# Patient Record
Sex: FEMALE | Race: WHITE
[De-identification: names, ages, dates, MRNs, and addresses within clinical notes are randomized per-mention and may not be internally consistent; named-entity substitution may affect disease eponyms.]

---

## 2017-09-26 ENCOUNTER — HOSPITAL ENCOUNTER (INPATIENT)
Dept: HOSPITAL 74 - JLDR | Age: 32
LOS: 2 days | Discharge: HOME | DRG: 560 | End: 2017-09-28
Attending: OBSTETRICS & GYNECOLOGY | Admitting: OBSTETRICS & GYNECOLOGY
Payer: COMMERCIAL

## 2017-09-26 VITALS — BODY MASS INDEX: 32.3 KG/M2

## 2017-09-26 DIAGNOSIS — Z3A.39: ICD-10-CM

## 2017-09-26 LAB
ANION GAP SERPL CALC-SCNC: 12 MMOL/L (ref 8–16)
BASOPHILS # BLD: 0.6 % (ref 0–2)
CALCIUM SERPL-MCNC: 8.8 MG/DL (ref 8.5–10.1)
CO2 SERPL-SCNC: 20 MMOL/L (ref 21–32)
CREAT SERPL-MCNC: 0.4 MG/DL (ref 0.55–1.02)
DEPRECATED RDW RBC AUTO: 13.3 % (ref 11.6–15.6)
EOSINOPHIL # BLD: 1.1 % (ref 0–4.5)
GLUCOSE SERPL-MCNC: 74 MG/DL (ref 74–106)
INR BLD: 0.95 (ref 0.82–1.09)
MCH RBC QN AUTO: 29.5 PG (ref 25.7–33.7)
MCHC RBC AUTO-ENTMCNC: 33.6 G/DL (ref 32–36)
MCV RBC: 87.9 FL (ref 80–96)
NEUTROPHILS # BLD: 69.9 % (ref 42.8–82.8)
PLATELET # BLD AUTO: 302 K/MM3 (ref 134–434)
PMV BLD: 9 FL (ref 7.5–11.1)
PT PNL PPP: 10.4 SEC (ref 9.98–11.88)
WBC # BLD AUTO: 13.1 K/MM3 (ref 4–10)

## 2017-09-26 RX ADMIN — ACETAMINOPHEN PRN MG: 325 TABLET ORAL at 11:38

## 2017-09-26 RX ADMIN — IBUPROFEN PRN MG: 600 TABLET, FILM COATED ORAL at 06:45

## 2017-09-26 RX ADMIN — ACETAMINOPHEN PRN MG: 325 TABLET ORAL at 21:22

## 2017-09-26 RX ADMIN — Medication SCH MLS/HR: at 09:46

## 2017-09-26 RX ADMIN — DOCUSATE SODIUM,SENNOSIDES PRN TABLET: 50; 8.6 TABLET, FILM COATED ORAL at 21:23

## 2017-09-26 RX ADMIN — Medication SCH MLS/HR: at 06:25

## 2017-09-26 RX ADMIN — IBUPROFEN PRN MG: 600 TABLET, FILM COATED ORAL at 21:22

## 2017-09-26 RX ADMIN — ACETAMINOPHEN PRN MG: 325 TABLET ORAL at 15:45

## 2017-09-26 RX ADMIN — ACETAMINOPHEN PRN MG: 325 TABLET ORAL at 06:45

## 2017-09-26 NOTE — HP
Admitting History and Physical





- Admission


Chief Complaint: labor pains


History of Present Illness: 


33 y/o  at term comes for labor pains. She is a pt of [facility] with prior 

vaginal deliveries.No prenatal issues.


History Source: Patient





- Past Medical History


CNS: No: Alzheimer's, CVA, Dementia, Migraine, Multiple Sclerosis, Peripheral 

Neuropathy, Parkinson's, Seizure, Syncope, TIA, Vertigo, Other


Cardiovascular: No: AFIB, Aneurysm, Aortic Insufficiency, Aortic Stenosis, CAD, 

CHF, Deep Vein Thrombosis, HTN, Hyperlipdemia, MI, Mitral Insufficiency, Mitral 

Stenosis, Murmur, Pulmonary Hypertension, Other


Pulmonary: No: Asthma, Bronchitis, Cancer, COPD, O2 Dependent, Pneumonia, 

Previously Intubated, Pulmonary Embolus, Pulmonary Fibrosis, Sleep Apnea, Other


Gastrointestinal: No: Ascites, Cancer, Constipation, Crohn's Disease, 

Diverticulitis, Diverticulosis, Esophageal Varices, Gastritis, GERD, GI Bleed, 

Hemorrhoids, Hiatal Hernia, Inflamatory Bowel Disease, Irritable Bowel Disease, 

Pancreatitis, Peptic Ulcer Disease, Ulcerative Colitis, Other


Hepatobiliary: No: Cirrhosis, Cholelithiasis, Cholecystitis, Choledocholithiasis

, Hepatitis A, Hepatitis B, Hepatitis C, Other


Renal/: No: Renal Failure, Renal Inusuff, BPH, Cancer, Hematuria, Hemodialysis

, Neurogenic Bladder, Renal Calculi, UTI, Other


Reproductive: No: Ectopic Pregnancy, Endometriosis, Fibroids, PID, Polycystic 

Ovary Syndrome, Postmenopausal, Other


...LMP: 12


...: 4


...Para: 3


Heme/Onc: No: Anemia, B12 Deficiency, Bleeding Disorder, Cancer, Current 

Chemotherapy, Current Radiation Therapy, Hemochromatosis, Hypercoaguable State, 

Myeloproliferative Synd, Sickle Cell Disease, Sickle Cell Trait, 

Thrombocytopenia, Other


Infectious Disease: No: AIDS, C-Diff, Herpes Zoster, HIV, MRSA, STD's, 

Tuberculosis, VREF, Other


Psych: No: Addictions, Anxiety, Bipolar, Depression, Panic, Psychosis, 

Schizophrenia, Other


Musculoskeletal: No: Bursitis, Chronic low back pain, Hemiparesis, Hemiplegia, 

Osteoarthritis, Paraplegia, Other


Rheumatology: No: Fibromyalgia, Gout, Lupus, Rheumatoid Arthritis, Sarcoidosis, 

Vasculitis, Other


ENT: No: Allergic Rhinitis, Sinusitis, Other


Endocrine: No: [place]'s Disease, Cushing's Disease, Diabetes Insipidus, 

Diabetes Mellitus, Hyperparathyroidism, Hyperthyroidism, Hypothyroidism, 

Osteopenia, SIADH, Other


Dermatology: No: Basal Cell, Cellulitis, Eczema, Melanoma, Psoriasis, Squamous 

Cell, Other





- Past Surgical History


Past Surgical History: No: None, AAA Repair, AICD, Amputation, Appendectomy, 

Arthrosocopy, AV Fistula/Graft, Bariatric Surgery, Breast Biopsy, Bypass, CABG, 

Carotid Endarterectomy, Cataract Removal, Cholecystectomy, Colectomy, 

Colonoscopy, Colostomy, Craniotomy, , Cystectomy, Hernia Repair, 

Hysterectomy, Ileal Conduit, Ileosotomy, Joint Replacement, Kidney Transplant, 

Laminectomy, Liver Transplant, Mastectomy, Nephrectomy, Oopherectomy, 

Orchiectomy, Permanent Pacemaker, Prostatectomy, Splenectomy, Stent, Thoracotomy

, TURP, Tonsillectomy, Tubal Ligation, Upper Endoscopy, Valve Replacement, 

Vasectomy, Vein Stripping/Ligation





- Advance Directives


Advance Directives: No: Living Will, Health Care Proxy, DNR, Organ Donor, 

Tissue Donor, MOLST





- Smoking History


Smoking history: Never smoked


Have you smoked in the past 12 months: No


Aproximately how many cigarettes per day: 0





- Alcohol/Substance Use


Hx Alcohol Use: No


History of Substance Use: denies: None, Cocaine, Heroin, Marijuana, Prescription

, Tranquilizers





- Social History


Usual Living Arrangement: No: Alone, With Spouse, With Parent, With Significant 

Other, With Child, Assisted Living, Nursing Home, Other





Home Medications





- Allergies


Allergies/Adverse Reactions: 


 Allergies











Allergy/AdvReac Type Severity Reaction Status Date / Time


 


erythromycin base Allergy Mild Difficulty Verified 17 22:14





[Erythromycin Base]   Breathing  














- Home Medications


Home Medications: 


Ambulatory Orders





Metoprolol Succinate [Toprol Xl -] 50 mg PO DAILY 17 


Prenatal Vit/Iron Fumarate/FA [Prenatal Tablet] 1 tab PO DAILY 17 











Review of Systems





- Review of Systems


Constitutional: reports: No Symptoms


Eyes: reports: No Symptoms


HENT: reports: No Symptoms


Neck: reports: No Symptoms


Cardiovascular: reports: No Symptoms


Respiratory: reports: No Symptoms


Gastrointestinal: reports: No Symptoms


Genitourinary: reports: No Symptoms


Breasts: reports: No Symptoms Reported


Musculoskeletal: reports: No Symptoms


Integumentary: reports: No Symptoms


Neurological: reports: No Symptoms


Endocrine: reports: No Symptoms





Physical Examination


Vital Signs: 


 Vital Signs











Temperature  98.5 F   17 07:00


 


Pulse Rate  66   17 07:00


 


Respiratory Rate  20   17 07:00


 


Blood Pressure  131/81   17 07:00


 


O2 Sat by Pulse Oximetry (%)  98   17 05:45











Constitutional: Yes: Well Nourished


Eyes: Yes: WNL


HENT: Yes: WNL


Neck: Yes: WNL, Rigid


Cardiovascular: Yes: WNL


Respiratory: Yes: WNL


Gastrointestinal: Yes: WNL


...Rectal Exam: Yes: WNL


Renal/: Yes: WNL


Labs: 


 CBC, BMP





 17 06:00 











Assessment/Plan


as above


admit


labs


expect

## 2017-09-26 NOTE — PN
Delivery





- Delivery


Vaginal Delivery: No Problems


Type of Anesthesia: None


Episiotomy/Laceration: None


EBL (cc): 250 (Called to evaluated due to FD and pushing)





Delivery, Single Birth





- Stages of Labor


Placenta: Yes: Spontaneous





- Condition of Infant


Infant Gender: Female


Position: OA

## 2017-09-27 LAB
BASOPHILS # BLD: 0.6 % (ref 0–2)
DEPRECATED RDW RBC AUTO: 13.2 % (ref 11.6–15.6)
EOSINOPHIL # BLD: 2.2 % (ref 0–4.5)
MCH RBC QN AUTO: 29.5 PG (ref 25.7–33.7)
MCHC RBC AUTO-ENTMCNC: 33.8 G/DL (ref 32–36)
MCV RBC: 87.3 FL (ref 80–96)
NEUTROPHILS # BLD: 63.1 % (ref 42.8–82.8)
PLATELET # BLD AUTO: 252 K/MM3 (ref 134–434)
PMV BLD: 8.5 FL (ref 7.5–11.1)
WBC # BLD AUTO: 10.8 K/MM3 (ref 4–10)

## 2017-09-27 RX ADMIN — ACETAMINOPHEN PRN MG: 325 TABLET ORAL at 16:24

## 2017-09-27 RX ADMIN — DOCUSATE SODIUM,SENNOSIDES PRN TABLET: 50; 8.6 TABLET, FILM COATED ORAL at 19:59

## 2017-09-27 RX ADMIN — IBUPROFEN PRN MG: 600 TABLET, FILM COATED ORAL at 03:08

## 2017-09-27 RX ADMIN — IBUPROFEN PRN MG: 600 TABLET, FILM COATED ORAL at 07:15

## 2017-09-27 RX ADMIN — ACETAMINOPHEN PRN MG: 325 TABLET ORAL at 07:12

## 2017-09-27 RX ADMIN — Medication SCH: at 12:02

## 2017-09-27 RX ADMIN — ACETAMINOPHEN PRN MG: 325 TABLET ORAL at 03:08

## 2017-09-27 RX ADMIN — IBUPROFEN PRN MG: 600 TABLET, FILM COATED ORAL at 16:24

## 2017-09-27 RX ADMIN — ACETAMINOPHEN PRN MG: 325 TABLET ORAL at 19:59

## 2017-09-27 RX ADMIN — IBUPROFEN PRN MG: 600 TABLET, FILM COATED ORAL at 11:42

## 2017-09-27 RX ADMIN — ACETAMINOPHEN PRN MG: 325 TABLET ORAL at 11:41

## 2017-09-27 RX ADMIN — IBUPROFEN PRN MG: 600 TABLET, FILM COATED ORAL at 20:00

## 2017-09-27 NOTE — CON.NEP
Consult


Consult Specialty:: Nephrology


Referred by:: Dr. Adams


Reason for Consultation:: Essential Hypertension Pre-Pregnancy





- History of Present Illness


Chief Complaint: Labor


History of Present Illness: 


This is a 32 year old woman with PMhx of essential hypertension (Dx 3 years ago)

, Asthma who presented at term in Labor now s/p vaginal delivery who is 

normotensive at this time on Metoprolol at home. Pt has no acute complaints. 

Reports being diagnosed with hypertension 3 years ago and started on Meds that 

required titration up. Pt reports that when her BP is high she gets HA's. BP 

has been fine throughout the pregnancy. No SOB, chest pain, abd pain, N/V/D. 

Has been taking metoprolol throughout the pregnancy. 





- History Source


History Provided By: Patient


Limitations to Obtaining History: No Limitations





- Past Medical History


CNS: No: Alzheimer's, CVA, Dementia, Migraine, Multiple Sclerosis, Peripheral 

Neuropathy, Parkinson's, Seizure, Syncope, TIA, Vertigo, Other


Cardio/Vascular: Yes: HTN.  No: AFIB, Aneurysm, Aortic Insufficiency, Aortic 

Stenosis, CAD, CHF, Deep Vein Thrombosis, Hyperlipdemia, MI, Mitral 

Insufficiency, Mitral Stenosis, Murmur, Pulmonary Hypertension, Other


Pulmonary: Yes: Asthma.  No: Bronchitis, Cancer, COPD, O2 Dependent, Pneumonia, 

Previously Intubated, Pulmonary Embolus, Pulmonary Fibrosis, Sleep Apnea, Other


Gastrointestinal: No: Ascites, Cancer, Constipation, Crohn's Disease, 

Diverticulitis, Diverticulosis, Esophageal Varices, Gastritis, GERD, GI Bleed, 

Hemorrhoids, Hiatal Hernia, Inflamatory Bowel Disease, Irritable Bowel Disease, 

Pancreatitis, Peptic Ulcer Disease, Ulcerative Colitis, Other


Hepatobiliary: No: Cirrhosis, Cholelithiasis, Cholecystitis, Choledocholithiasis

, Hepatitis A, Hepatitis B, Hepatitis C, Other


Renal/: No: Renal Failure, Renal Inusuff, BPH, Cancer, Hematuria, Hemodialysis

, Neurogenic Bladder, Renal Calculi, UTI, Other


...LMP: 12


Infectious Disease: No: AIDS, C-Diff, Herpes Zoster, HIV, MRSA, STD's, 

Tuberculosis, VREF, Other


Psych: No: Addictions, Anxiety, Bipolar, Depression, Panic, Psychosis, 

Schizophrenia, Other


Musculoskeletal: No: Bursitis, Chronic low back pain, Hemiparesis, Hemiplegia, 

Osteoarthritis, Paraplegia, Other


Rheumatology: No: Fibromyalgia, Gout, Lupus, Rheumatoid Arthritis, Sarcoidosis, 

Vasculitis, Other


ENT: No: Allergic Rhinitis, Sinusitis, Other


Endocrine: No: [place]'s Disease, Cushing's Disease, Diabetes Insipidus, 

Diabetes Mellitus, Hyperparathyroidism, Hyperthyroidism, Hypothyroidism, 

Osteopenia, SIADH, Other


Dermatology: No: Basal Cell, Cellulitis, Eczema, Melanoma, Psoriasis, Squamous 

Cell, Other





- Past Surgical History


Past Surgical History: No: None, AAA Repair, AICD, Amputation, Appendectomy, 

Arthrosocopy, AV Fistula/Graft, Bariatric Surgery, Breast Biopsy, Bypass, CABG, 

Carotid Endarterectomy, Cataract Removal, Cholecystectomy, Colectomy, 

Colonoscopy, Colostomy, Craniotomy, , Cystectomy, Hernia Repair, 

Hysterectomy, Ileal Conduit, Ileosotomy, Joint Replacement, Kidney Transplant, 

Laminectomy, Liver Transplant, Mastectomy, Nephrectomy, Oopherectomy, 

Orchiectomy, Permanent Pacemaker, Prostatectomy, Splenectomy, Stent, Thoracotomy

, TURP, Tonsillectomy, Tubal Ligation, Upper Endoscopy, Valve Replacement, 

Vasectomy, Vein Stripping/Ligation





- Alcohol/Substance Use


Hx Alcohol Use: No


History of Substance Use: denies: None, Cocaine, Heroin, Marijuana, Prescription

, Tranquilizers





- Smoking History


Smoking history: Never smoked


Have you smoked in the past 12 months: No


Aproximately how many cigarettes per day: 0





Home Medications





- Allergies


Allergies/Adverse Reactions: 


 Allergies











Allergy/AdvReac Type Severity Reaction Status Date / Time


 


erythromycin base Allergy Mild Difficulty Verified 17 22:14





[Erythromycin Base]   Breathing  














- Home Medications


Home Medications: 


Ambulatory Orders





Metoprolol Succinate [Toprol Xl -] 50 mg PO DAILY 17 


Prenatal Vit/Iron Fumarate/FA [Prenatal Tablet] 1 tab PO DAILY 17 











Review of Systems





- Review of Systems


Constitutional: reports: No Symptoms


Eyes: reports: No Symptoms


HENT: reports: No Symptoms


Neck: reports: No Symptoms


Respiratory: reports: Snoring


Gastrointestinal: reports: No Symptoms


Genitourinary: reports: No Symptoms


Musculoskeletal: reports: No Symptoms


Integumentary: reports: No Symptoms


Neurological: reports: No Symptoms





Nephrology Consult





- Height


Height: 4 ft 10 in





- Weight


Weight: 155 lb





- BMI


Body Mass Index (BMI): 32.3





- Lab Results


CBC,BMP: 


 CBC, BMP





 17 06:50 





 17 06:00 








Anion Gap: 


 Anion Gap











Anion Gap  12  (8-16)   17  06:00    














- Physical Examination


Vital Signs: 


 Vital Signs











Temperature  97.9 F   17 06:00


 


Pulse Rate  62   17 06:00


 


Respiratory Rate  18   17 06:00


 


Blood Pressure  122/77   17 06:00


 


O2 Sat by Pulse Oximetry (%)  100   17 08:00











Constitutional: Yes: No Distress, Calm


Eyes: Yes: Conjunctiva Clear


HENT: Yes: Atraumatic


Neck: Yes: Supple


Cardiovascular: Yes: Regular Rate and Rhythm


Respiratory: Yes: Regular, CTA Bilaterally


Extremities: No: Cold, Cool, Cyanosis


Edema: No





Problem List





- Problems


(1) Vaginal delivery


Code(s): O80 - ENCOUNTER FOR FULL-TERM UNCOMPLICATED DELIVERY





(2) Hypertension


Code(s): I10 - ESSENTIAL (PRIMARY) HYPERTENSION   








Assessment/Plan


32 year old woman with PMhx of essential hypertension (Dx 3 years ago), Asthma 

who presented at term in Labor now s/p vaginal delivery who is normotensive at 

this time on Metoprolol at home.





#Hx of Essential Hypertension now with normal BP following pregnancy


Pt likely with improved BP due to physiologic effects of the pregnancy


would not continue metoprolol at this time as the risk of hypotension is high  


would monitor BP as a inpatient, if it remains consistently below 140/90 would 

monitor off medication as a outpatient


given her age she would benafit from a secondary hypertension w/u


she does have a family history of hypertension (grandparents)





Thank you 


Will follow





Andres Mcgarry DO

## 2017-09-27 NOTE — PN
Post Partum Progress Note





- Subjective


Subjective: 


no c/o headache or cramps 


pt requests for her BP meds which she has been taking for 1 year 





Post Partum Day: 1


Type of Delivery: 


Vital Signs: 


 Vital Signs











Temperature  97.9 F   17 06:00


 


Pulse Rate  62   17 06:00


 


Respiratory Rate  18   17 06:00


 


Blood Pressure  122/77   17 06:00


 


O2 Sat by Pulse Oximetry (%)  100   17 08:00











Breast Exam: Yes: Soft, Other (bottle feding ).  No: Engorged


Uterus: Yes: Fundus Firm, Fundus below umbilicus, Non-tender


Lochia: Yes: Rubra


Lochia, amount: Moderate


Extremities: Yes: Calves non-tender


Perineum: Yes: Intact


Activity: Ambulating





- Labs


Labs: 


 CBC











WBC  10.8 K/mm3 (4.0-10.0)  H  17  06:50    


 


RBC  3.60 M/mm3 (3.60-5.2)   17  06:50    


 


Hgb  10.6 GM/dL (10.7-15.3)  L  17  06:50    


 


Hct  31.5 % (32.4-45.2)  L  17  06:50    


 


MCV  87.3 fl (80-96)   17  06:50    


 


MCH  29.5 pg (25.7-33.7)   17  06:50    


 


MCHC  33.8 g/dl (32.0-36.0)   17  06:50    


 


RDW  13.2 % (11.6-15.6)   17  06:50    


 


Plt Count  252 K/MM3 (134-434)   17  06:50    


 


MPV  8.5 fl (7.5-11.1)   17  06:50    


 


Neutrophils %  63.1 % (42.8-82.8)   17  06:50    


 


Lymphocytes %  28.2 % (8-40)  D 17  06:50    


 


Monocytes %  5.9 % (3.8-10.2)   17  06:50    


 


Eosinophils %  2.2 % (0-4.5)  D 17  06:50    


 


Basophils %  0.6 % (0-2.0)   17  06:50    














Assessment/Plan


stable. 


Nephrology consult obtained from Dr Mcgarry , he recommends no need of 

antihypertensive meds presently 


she will need work up for secondary HTN in the office 


plan ct pp care

## 2017-09-28 VITALS — DIASTOLIC BLOOD PRESSURE: 77 MMHG | SYSTOLIC BLOOD PRESSURE: 130 MMHG | HEART RATE: 64 BPM | TEMPERATURE: 98.2 F

## 2017-09-28 RX ADMIN — ACETAMINOPHEN PRN MG: 325 TABLET ORAL at 06:40

## 2017-09-28 RX ADMIN — IBUPROFEN PRN MG: 600 TABLET, FILM COATED ORAL at 01:14

## 2017-09-28 RX ADMIN — ACETAMINOPHEN PRN MG: 325 TABLET ORAL at 01:13

## 2017-09-28 RX ADMIN — IBUPROFEN PRN MG: 600 TABLET, FILM COATED ORAL at 06:40

## 2017-09-28 NOTE — PN
Progress Note (short form)





- Note


Progress Note: 


Renal Follow for Hx of hypertension





Pt seen and examined at the bedside


no acute complaints


for discharge home today


no HA, CP, blurry visoin, SOB





 Vital Signs











Temperature  98.2 F   09/28/17 07:30


 


Pulse Rate  64   09/28/17 07:30


 


Respiratory Rate  20   09/28/17 07:30


 


Blood Pressure  130/77   09/28/17 07:30


 


O2 Sat by Pulse Oximetry (%)  100   09/28/17 07:30








 Intake & Output











 09/25/17 09/26/17 09/27/17 09/28/17





 23:59 23:59 23:59 23:59


 


Intake Total  1700  


 


Balance  1700  


 


Weight  155 lb 155 lb 








NAD, awake and alert


RRR, No M/R


CTA


Ext : trace edema





 CBC, BMP





 09/27/17 06:50 





 09/26/17 06:00 





 Current Medications





Acetaminophen (Tylenol -)  650 mg PO Q3H PRN


   PRN Reason: PAIN


   Last Admin: 09/28/17 06:40 Dose:  650 mg


Benzocaine (Americaine Ointment -)  1 applic TP PRN PRN


   PRN Reason: PAIN


Benzocaine (Americaine 20% Spray -)  1 spray TP PRN PRN


   PRN Reason: PAIN


Bisacodyl (Dulcolax Suppository -)  10 mg RC PRN PRN


   PRN Reason: CONSTIPATION


Dextrose/Lactated Ringer's (Pitocin 20 Units In D5-Lr -)  1,000 mls @ 100 mls/

hr IV ASDIR ROMULO


   Last Admin: 09/27/17 12:02 Dose:  Not Given


Ibuprofen (Motrin -)  600 mg PO Q4H PRN


   PRN Reason: PAIN


   Last Admin: 09/28/17 06:40 Dose:  600 mg


Methylergonovine Maleate (Methergine Injection -)  0.2 mg IM Q4H PRN


   PRN Reason: EXCESSIVE BLEEDING (L&D)


Oxycodone HCl (Roxicodone -)  5 mg PO Q4H PRN


   PRN Reason: PAIN


   Last Admin: 09/26/17 15:45 Dose:  5 mg


Senna/Docusate Sodium (Pericolace -)  2 tablet PO HS PRN


   PRN Reason: CONSTIPATION


   Last Admin: 09/27/17 19:59 Dose:  2 tablet


Witch Hazel/Glycerin (Tucks Pads -)  1 pad TP PRN PRN


   PRN Reason: PAIN





A/p


32 year old woman with PMhx of essential hypertension (Dx 3 years ago), Asthma 

who presented at term in Labor now s/p vaginal delivery who is normotensive at 

this time on Metoprolol at home.





#Hx of Essential Hypertension now with normal BP following pregnancy


BP still below threshold of hypertension


pt can be discharged off meds at this time but may require restarting it as a 

outpatient


should observe a low salt diet


if hypertension comes back may need secondary hypertension work up given age





Thank you


will follow up in the office in 2 weeks





Andres Mcgarry DO





Problem List





- Problems


(1) Vaginal delivery


Code(s): O80 - ENCOUNTER FOR FULL-TERM UNCOMPLICATED DELIVERY





(2) Hypertension


Code(s): I10 - ESSENTIAL (PRIMARY) HYPERTENSION

## 2017-09-28 NOTE — DS
Physical Exam-GYN


Vital Signs: 


 Vital Signs











Temperature  98.2 F   17 07:30


 


Pulse Rate  64   17 07:30


 


Respiratory Rate  20   17 07:30


 


Blood Pressure  130/77   17 07:30


 


O2 Sat by Pulse Oximetry (%)  100   17 07:30











Constitutional: Yes: Well Nourished


Eyes: Yes: Conjunctiva Clear


HENT: Yes: Atraumatic


Neck: Yes: Supple


Cardiovascular: Yes: Regular Rate and Rhythm


Respiratory: Yes: Regular


Gastrointestinal: Yes: Normal Bowel Sounds


External Genitalia: Yes: Normal


Vaginal Exam: Yes: Normal


Cervix: Yes: Normal


Uterus: Yes: Firm


....Post Partum: Yes: Uterus firm, Moderate lochia serosa


Breast(s): Yes: WNL


Neurological: Yes: Alert, Oriented


...Motor Strength: WNL


Psychiatric: Yes: Alert, Oriented


Labs: 


 CBC, BMP





 17 06:50 





 17 06:00 











Delivery





- Delivery


Vaginal Delivery: No Problems


Type of Anesthesia: None


Episiotomy/Laceration: None


EBL (cc): 250





Delivery, Single Birth





- Stages of Labor


Date 1st Stage Initiatied: 17


Time 1st Stage Initiated: 03:00


Date 2nd Stage Initiated: 17


Time 2nd Stage Initiated: 06:10


Date of Delivery: 17


Time of Delivery: 06:15


Time Placenta Delivered: 06:25


Placenta: Yes: Spontaneous





- Condition of Infant


Pediatrician/Neonatologist Present: No


Infant Gender: Female


Birth Weight: 7 lb 1 oz


Position: OA


Total Hours ROM (Hrs/Mins): 5 MINS





- Apgar


  ** 1 Minute


Apgar Total Score: 9





  ** 5 Minutes


Apgar Total Score: 9





- [place] Feeding Plan


Initial Plan: Elected not to breastfeed exclusively throughout hospitalization





Discharge Summary


Reason For Visit: LABOR


Current Active Problems





Hypertension (Acute) 








Procedures: Principal: Normal spontaneous vaginal delivery


Hospital Course: 


33 yo with h/o essential hypertension, status post vaginal delivery, was seen 

by Nephrologist for elevated blood pressure.





Condition: Good





- Instructions


Diet, Activity, Other Instructions: 


Regular diet


No douching, no sexual intercourse x 6 weeks.


F/U with Nephrologist as outpatient.


Disposition: HOME





- Home Medications


Comprehensive Discharge Medication List: 


Ambulatory Orders





Metoprolol Succinate [Toprol Xl -] 50 mg PO DAILY 17 


Prenatal Vit/Iron Fumarate/FA [Prenatal Tablet] 1 tab PO DAILY 17

## 2020-07-14 ENCOUNTER — HOSPITAL ENCOUNTER (EMERGENCY)
Dept: HOSPITAL 74 - JER | Age: 35
LOS: 1 days | Discharge: HOME | End: 2020-07-15
Payer: COMMERCIAL

## 2020-07-14 VITALS — HEART RATE: 105 BPM | SYSTOLIC BLOOD PRESSURE: 152 MMHG | DIASTOLIC BLOOD PRESSURE: 88 MMHG | TEMPERATURE: 97.9 F

## 2020-07-14 VITALS — BODY MASS INDEX: 30.3 KG/M2

## 2020-07-14 DIAGNOSIS — Z3A.34: ICD-10-CM

## 2020-07-14 DIAGNOSIS — J31.0: ICD-10-CM

## 2020-07-14 DIAGNOSIS — J01.00: ICD-10-CM

## 2020-07-14 DIAGNOSIS — O99.513: Primary | ICD-10-CM

## 2020-07-14 PROCEDURE — 3E0F7GC INTRODUCTION OF OTHER THERAPEUTIC SUBSTANCE INTO RESPIRATORY TRACT, VIA NATURAL OR ARTIFICIAL OPENING: ICD-10-PCS

## 2020-07-14 NOTE — PDOC
History of Present Illness





- General


Chief Complaint: Cold Symptoms


Stated Complaint: 34 WEEKS PREGNANT/SOB


Time Seen by Provider: 20 19:44


History Source: Patient


Exam Limitations: No Limitations





Past History





- Travel History


Traveled outside of the country in the last 30 days: No


Close contact w/someone who was outside of country & ill: No





- Medical History


Allergies/Adverse Reactions: 


                                    Allergies











Allergy/AdvReac Type Severity Reaction Status Date / Time


 


erythromycin base Allergy Mild Difficulty Verified 20 19:48





[Erythromycin Base]   Breathing  











Home Medications: 


Ambulatory Orders





Prenatal Vit/Iron Fum/Folic AC [Prenatal Tablet] 1 tab PO DAILY 17 


Amox-Tr/K Cl [Augmentin 400 mg/5 ml Oral Suspension -] 10 ml PO BID #140 ml 

20 


Loratadine [Claritin -] 10 mg PO DAILY #7 tablet 20 








Asthma: No


Cancer: No


Cardiac Disorders: No


COPD: No


Diabetes: No


HTN: No


Seizures: No


Thyroid Disease: No





- Reproductive History


 (#): 3


Para: 2


Therapeutic (s) & number: No


Spontaneous : 0





- Psycho-Social/Smoking History


Smoking Status: No


Smoking History: Never smoked


Have you smoked in the past 12 months: No


Number of Cigarettes Smoked Daily: 0





- Substance Abuse Hx (Audit-C & DAST Scrn)


How often the patient has a drink containing alcohol: Never


Score: In Men: 4 or > Positive; In Women: 3 or > Positive: 0


Screen Result (Pos requires Nsg. Audit-10AR): Negative





**Review of Systems





- Review of Systems


Able to Perform ROS?: Yes


Comments:: 





20 21:57


CONSTITUTIONAL: 


Absent: fever, chills, diaphoresis, generalized weakness, malaise, loss of 

appetite


HEENT: 


Present: Nasal congestion absent: rhinorrhea, nasal congestion, throat pain, 

throat swelling, difficulty swallowing, mouth swelling, ear pain, eye pain, 

visual Changes


CARDIOVASCULAR: 


Absent: chest pain, loss of consciousness, palpitations, irregular heart rate, 

peripheral edema


RESPIRATORY: 


Absent: cough, shortness of breath, dyspnea with exertion, orthopnea, wheezing, 

stridor, hemoptysis


GASTROINTESTINAL:


Absent: abdominal pain, abdominal distension, nausea, vomiting, diarrhea, 

constipation, melena, hematochezia


GENITOURINARY: 


Absent: dysuria, frequency, urgency, hesitancy, hematuria, flank pain, genital 

pain


MUSCULOSKELETAL: 


Absent: myalgia, arthralgia, joint swelling


SKIN: 


Absent: rash, itching, pallor


HEMATOLOGIC/IMMUNOLOGIC: 


Absent: easy bleeding, easy bruising, lymphadenopathy, frequent infections


ENDOCRINE:


Absent: unexplained weight gain, unexplained weight loss, heat intolerance, cold

intolerance


NEUROLOGIC: 


Absent: headache, focal weakness or paresthesias, dizziness, unsteady gait, 

seizure, mental status changes, bladder or bowel incontinence


PSYCHIATRIC: 


Absent: anxiety, depression, suicidal or homicidal ideation, hallucinations.


Is the patient limited English proficient: No





*Physical Exam





- Vital Signs


                                Last Vital Signs











Temp Pulse Resp BP Pulse Ox


 


 97.9 F   105 H  18   152/88   99 


 


 20 19:45  20 19:45  20 19:45  20 19:45  20 19:45














- Physical Exam





20 21:57


GENERAL:


Well developed, well nourished. Awake and alert. No acute distress.


HEENT:


Normocephalic, atraumatic. PERRLA, EOMI. No conjunctival pallor. Sclera are non-

icteric. Moist mucous membranes.  Nasal passages are pearly in color with i

nflamed nasal antoni.  Oropharynx is clear.


NECK: 


Supple. Full ROM. No JVD. Carotid pulses 2+ and symmetric, without bruits. No 

thyromegaly. No lymphadenopathy.


CARDIOVASCULAR:


Regular rate and rhythm. No murmurs, rubs, or gallops. Distal pulses are 2+ and 

symmetric. 


PULMONARY: 


No evidence of respiratory distress. Lungs clear to auscultation bilaterally. No

wheezing, rales or rhonchi.


ABDOMINAL:


Soft. Non-tender. Non-distended. No rebound or guarding. No organomegaly. 

Normoactive bowel sounds. 


MUSCULOSKELETAL 


Normal range of motion at all joints. No bony deformities or tenderness. No CVA 

tenderness.


EXTREMITIES: 


No cyanosis. No clubbing. No edema. No calf tenderness.


SKIN: 


Warm and dry. Normal capillary refill. No rashes. No jaundice. 


NEUROLOGICAL: 


Alert, awake, appropriate. Cranial nerves 2-12 intact. No deficits to light 

touch and temperature in face, upper extremities and lower extremities. No motor

deficits in the in face, upper extremities and lower extremities. Normoreflexic 

in the upper and lower extremities. Normal speech. Toes are down-going 

bilaterally. Gait is normal without ataxia.


PSYCHIATRIC: 


Cooperative. Good eye contact. Appropriate mood and affect.





Medical Decision Making





- Medical Decision Making





20 21:59


Patient is a 35-year-old female currently 34 weeks pregnant, presents to the ER 

with worsening nasal congestion over the past week.  She notes that she has been

having this problem for the past 2 to 3 months however it is increased over the 

past week.  She states that she find it difficult to swallow because of her 

congestion.  She has tried multiple nasal sprays with little relief of her 

symptoms.  She was instructed by her OB to come in for evaluation.  Denies 

fever, cough, lightheadedness, dizziness, sore throat, nausea and vomiting, 

abdominal pain and vaginal discharge.





OB: Dr. Singh





A/P: Nasal congestion


On exam patient has tenderness to palpation over the maxillary sinuses, noted 

nasal congestion on exam


Normal saline neb, amoxicillin and Claritin given for probable sinus infection


We will discharge home back to her OB, patient to go to labor and delivery prior

to discharge.











Discharge





- Discharge Information


Problems reviewed: Yes


Clinical Impression/Diagnosis: 


Rhinitis


Qualifiers:


 Rhinitis type: chronic Qualified Code(s): J31.0 - Chronic rhinitis





Sinusitis


Qualifiers:


 Sinusitis location: maxillary Chronicity: acute Recurrence: non-recurrent 

Qualified Code(s): J01.00 - Acute maxillary sinusitis, unspecified





Condition: Stable


Disposition: HOME





- Admission


No





- Follow up/Referral





- Patient Discharge Instructions


Additional Instructions: 


You were seen for your nasal congestion today.


Your strep test was negative


You most likely have a sinus infection.


Please take the antibiotics as prescribed for 1 week.  Avoid using the nasal 

sprays as this may be causing worse congestion.


Please take the Claritin once a day for a week.


Follow-up with your OB this week.





Return to the ER for worsening congestion, fever or if you have any changes in 

your symptoms.





- Post Discharge Activity

## 2020-07-14 NOTE — PDOC
Rapid Medical Evaluation


Chief Complaint: Cold Symptoms


Time Seen by Provider: 07/14/20 19:44


Medical Evaluation: 


                                    Allergies











Allergy/AdvReac Type Severity Reaction Status Date / Time


 


erythromycin base Allergy Mild Difficulty Verified 09/25/17 22:14





[Erythromycin Base]   Breathing  











07/14/20 19:45


35 year old female 34 weeks pregnant c/o nasal congestion for months. denies 

ShORtness pf breath, fever/ CHILLS. patient reports that nasal spray is making 

the congestion worse. denies abdominal pain, pelvic pain


                                Last Vital Signs











Temp Pulse Resp BP Pulse Ox


 


 97.9 F   105 H  18   152/88   99 


 


 07/14/20 19:45  07/14/20 19:45  07/14/20 19:45  07/14/20 19:45  07/14/20 19:45











PE: patient alert + nasal congestion





A: nasal congestion








p: patient to the ER for further management of care. 


07/14/20 19:49








**Discharge Disposition





- Diagnosis


Rhinitis


Qualifiers:


 Rhinitis type: chronic Qualified Code(s): J31.0 - Chronic rhinitis








- Referrals





- Patient Instructions





- Post Discharge Activity

## 2020-07-15 LAB
APPEARANCE UR: CLEAR
BILIRUB UR STRIP.AUTO-MCNC: NEGATIVE MG/DL
COLOR UR: YELLOW
KETONES UR QL STRIP: (no result)
LEUKOCYTE ESTERASE UR QL STRIP.AUTO: NEGATIVE
NITRITE UR QL STRIP: NEGATIVE
PH UR: 5.5 [PH] (ref 5–8)
PROT UR QL STRIP: NEGATIVE
PROT UR QL STRIP: NEGATIVE
SP GR UR: 1.02 (ref 1.01–1.03)
UROBILINOGEN UR STRIP-MCNC: 0.2 MG/DL (ref 0.2–1)

## 2020-07-15 PROCEDURE — 3E0337Z INTRODUCTION OF ELECTROLYTIC AND WATER BALANCE SUBSTANCE INTO PERIPHERAL VEIN, PERCUTANEOUS APPROACH: ICD-10-PCS

## 2020-07-15 NOTE — PD.OB.PROG
Past Medical History





- Primary Care Physician


PCP:: Brenda Adams


Documenting Provider Type: Attending





- Admission


Chief Complaint: 36 [id number] , 34.6/7 weeks by dates & 34 wks by sono  sent 

from ED after evaluation of sinusitis & treatment sent for fetal monitoring . uc

reg noted , pt c/o discomfort cramping on & off for 2-3 days


History of Present Illness: 


pnc at , [facility] 





chart review h/o htn prior to pregn she was on po norvasc 5mg, switched to po 

Labetalol 200 mg bid & Asprin 81 mg from 200 mg bid 


pt states due to her lo bp gradually labetalol was d/joselito 


US review 


20 7.6 wk, declined Genetic Counselling offered due to AMA


NIPT +ve for DS , declined [facility]/or [facility]


AFP screen neg 


20 Us Fetal anatomy : suspected AVCanal defect, persistent SVC, fetal echo 

at [facility] recommended


20 US: Fetal echo suggestive of ASC & LSVD . 33.0 wks , Breech, chintan 13.2cm, 

bpp8/8 ,Cx 3.38cm .


Pt states discussion of transfer her care to [facility] happened , & [facility] stated she 

could deliver at [facility] & if need transfer the baby to [facility] nicu.





prenatal labs reviewed


h/o abn pap in past cone bx cx 10/2019 , path report no dysplasia 


 


 


History Source: Patient, Medical Record


Limitations to Obtaining History: No Limitations





- Nursing Documentation


Maternal Fetal Triage Index: 


3





Nursing Documentation Reviewed: Yes





- Past Medical History


CNS: Denies/None


Cardio/Vascular: Denies/None


Pulmonary: Denies/None


Gastrointestinal: Denies/None


Hepatobiliary: Denies/None


Renal/: Denies/None


Reproductive: Denies/None


...: 5


...Para: 4 (1st VD at [facility], & 3rd & 4th VD at [facility])


...Term: 4 (G1 01nsvd 37 wks 5'15'. G2 05  37 wks 6'8", G3 

:13  41 wks 6'8".  G4 :12  40 wks 6'8" )


...LMP: 19


... Weeks Gestation by Dates: 34.6


...EDC by Dates: 20


...EDC by Sono: 20 (34.0 wks )


Infectious Disease: Denies/None


Psych: Denies/None


Musculoskeletal: Denies/None


Rheumatology: Denies/None


ENT: Sinusitis (h/o post nasl drip diagnosed by ent, rx saline nasal drops given

not effective)


Endocrine: Denies/None





- Past Surgical History


Additional Surgical History: Cone Bx Cervix 10/2019 at [facility]





- Smoking History


Smoking history: Never smoked


Have you smoked in the past 12 months: No


Aproximately how many cigarettes per day: 0





- Alcohol/Substance Use


Hx Alcohol Use: No





Review of Systems





- Review of Systems


Constitutional: reports: Chills, Weakness.  denies: Fever


Eyes: reports: No Symptoms


HENT: reports: Nasal Congestion, Throat Pain


Neck: reports: No Symptoms


Cardiovascular: reports: No Symptoms


Respiratory: reports: No Symptoms


Gastrointestinal: reports: No Symptoms


Genitourinary: reports: No Symptoms


Breasts: reports: No Symptoms Reported


Musculoskeletal: reports: No Symptoms


Integumentary: reports: No Symptoms


Neurological: reports: No Symptoms


Endocrine: reports: No Symptoms


Hematology/Lymphatic: reports: No Symptoms


Psychiatric: reports: No Symptoms


Pain Intensity: 6





Physical Exam - Obstetrical


Vital Signs: 





                                   Vital Signs











Temperature  97.9 F   20 19:45


 


Pulse Rate  105 H  20 19:45


 


Respiratory Rate  18   20 19:45


 


Blood Pressure  152/88   20 19:45


 


O2 Sat by Pulse Oximetry (%)  99   20 19:45








22.40 v/s in L&D T 97.8 


                        /76


                        Pulse 81


                                Selected Entries











  20





  19:45


 


Weight 145 lb











Constitutional: Yes: Well Nourished, Moderate Distress, Other (dehydrated)


HENT: Yes: Nasal Congestion.  No: Pharyngeal Erythema, Tonsillar Exudate


Neck: Yes: WNL


Cardiovascular: Yes: WNL


Lungs: Clear to auscultation


Breast(s): Yes: Other (not checked)





- Abdominal Exam/OB


Fundal Height: 35


Number of Fetuses: Single


Fetal Presentation: Breech


Contractions: Yes


Regularity: Irregular (3-5-7 min)


Intensity: Moderate


Fetal Monitor Mode: External


Fetal Heart Rate (range): 130


Fetal Heart Rate Location: Midline


Category: I


Accelerations: Non-Uniform


Decelerations: None





- Vaginal Exam/OB


Vaginal Exam Deferred: Yes


Vaginal Bleeding: No


Speculum Exam: No


Dilatation (cm): close


Effacement (%): unefface


Amniotic Membrane Status: Intact


Fetal Presentation: Chris Breech


Fetal Station: -4





- Physical Exam


Musculoskeletal: Yes: WNL


Extremities: Yes: WNL.  No: Calf Tenderness


Edema: LLE: Trace, RLE: Trace


Integumentary: Yes: Tattoos


Deep Tendon Reflex Grade: Hyperactive,very brisk +4


Psychiatric: Yes: WNL, Alert, Oriented





- Labs


Lab Results: 





                                Laboratory Tests











  20





  20:10 23:45 23:45


 


Urine Color   Yellow 


 


Urine Appearance   Clear 


 


Urine pH   5.5 


 


Ur Specific Gravity   1.021 


 


Urine Protein   Negative 


 


Urine Glucose (UA)   Negative 


 


Urine Ketones   4+ H 


 


Urine Blood   Negative 


 


Urine Nitrite   Negative 


 


Urine Bilirubin   Negative 


 


Urine Urobilinogen   0.2 


 


Ur Leukocyte Esterase   Negative 


 


Ur Random Creatinine    101.0


 


U Random Total Protein    21.6 H


 


Protein/Creatinin Ratio    0.2


 


Group A Strep Rapid  Negative  














Problem List





- Problems


(1) Pregnancy with 34 completed weeks gestation


Code(s): Z3A.34 - 34 WEEKS GESTATION OF PREGNANCY   





(2) Rhinitis


Code(s): J31.0 - CHRONIC RHINITIS   


Qualifiers: 


   Rhinitis type: chronic   Qualified Code(s): J31.0 - Chronic rhinitis   





(3) Sinusitis


Code(s): J32.9 - CHRONIC SINUSITIS, UNSPECIFIED   


Qualifiers: 


   Sinusitis location: maxillary   Chronicity: acute   Recurrence: non-recurrent

  Qualified Code(s): J01.00 - Acute maxillary sinusitis, unspecified   





(4) Grand multipara


Code(s): Z64.1 - PROBLEMS RELATED TO MULTIPARITY   





(5) History of threatened premature labor


Code(s): Z87.59 - PERSONAL HISTORY OF COMP OF PREG, CHLDBRTH AND THE PUERP   





(6) Breech presentation of fetus


Code(s): O32.1XX0 - MATERNAL CARE FOR BREECH PRESENTATION, UNSP   


Qualifiers: 


   Fetus number: single or unspecified fetus   Qualified Code(s): O32.1XX0 - 

Maternal care for breech presentation, not applicable or unspecified   





(7) Dehydration


Code(s): E86.0 - DEHYDRATION   





Assessment/Plan


35 yrs , 34 wks ,Ac sinusitis & Rhinitis rx po augmentin & claritin 


Urine ketone 4+, dehydration, due to lack of fluid intake 


threatened PTL , IV hydration uc subsided 


Breech presentation, 


? fetal cardiac defects 


ct po fluids at home 


avoid drinking cold drinks 


ct ED treatment 


rtc as per appt in 1 wk 





after loading dose uc subsided , will complete 1000 ml , before discharge

## 2020-07-31 ENCOUNTER — HOSPITAL ENCOUNTER (INPATIENT)
Dept: HOSPITAL 74 - JLDR | Age: 35
LOS: 2 days | Discharge: HOME | DRG: 560 | End: 2020-08-02
Attending: OBSTETRICS & GYNECOLOGY | Admitting: OBSTETRICS & GYNECOLOGY
Payer: COMMERCIAL

## 2020-07-31 VITALS — BODY MASS INDEX: 31.9 KG/M2

## 2020-07-31 DIAGNOSIS — Z3A.36: ICD-10-CM

## 2020-07-31 LAB
ANION GAP SERPL CALC-SCNC: 11 MMOL/L (ref 8–16)
APTT BLD: 25.3 SECONDS (ref 25.2–36.5)
BASE EXCESS BLDCOA CALC-SCNC: -4.7 MMOL/L (ref 0–2)
BASE EXCESS BLDCOA CALC-SCNC: -5.4 MMOL/L (ref 0–2)
BASOPHILS # BLD: 1.1 % (ref 0–2)
BUN SERPL-MCNC: 5 MG/DL (ref 7–18)
CALCIUM SERPL-MCNC: 8.8 MG/DL (ref 8.5–10.1)
CHLORIDE SERPL-SCNC: 109 MMOL/L (ref 98–107)
CO2 SERPL-SCNC: 18 MMOL/L (ref 21–32)
CREAT SERPL-MCNC: 0.4 MG/DL (ref 0.55–1.3)
DEPRECATED RDW RBC AUTO: 14.6 % (ref 11.6–15.6)
EOSINOPHIL # BLD: 3.5 % (ref 0–4.5)
GLUCOSE SERPL-MCNC: 78 MG/DL (ref 74–106)
HCO3 BLDCO-SCNC: 19.2 MMHG (ref 20–29)
HCO3 BLDCO-SCNC: 19.8 MMHG (ref 20–29)
HCT VFR BLD CALC: 37.5 % (ref 32.4–45.2)
HGB BLD-MCNC: 12.3 GM/DL (ref 10.7–15.3)
INR BLD: 0.93 (ref 0.83–1.09)
LYMPHOCYTES # BLD: 12.7 % (ref 8–40)
MCH RBC QN AUTO: 29.2 PG (ref 25.7–33.7)
MCHC RBC AUTO-ENTMCNC: 32.8 G/DL (ref 32–36)
MCV RBC: 89 FL (ref 80–96)
MONOCYTES # BLD AUTO: 5.7 % (ref 3.8–10.2)
NEUTROPHILS # BLD: 77 % (ref 42.8–82.8)
PCO2 BLDCO: 32 MMHG (ref 30–78)
PCO2 BLDCO: 37.9 MMHG (ref 30–78)
PH BLDCO: 7.34 [PH] (ref 7.14–7.44)
PH BLDCO: 7.39 [PH] (ref 7.14–7.44)
PLATELET # BLD AUTO: 269 K/MM3 (ref 134–434)
PMV BLD: 9 FL (ref 7.5–11.1)
POTASSIUM SERPLBLD-SCNC: 3.9 MMOL/L (ref 3.5–5.1)
PT PNL PPP: 11 SEC (ref 9.7–13)
RBC # BLD AUTO: 4.21 M/MM3 (ref 3.6–5.2)
SODIUM SERPL-SCNC: 138 MMOL/L (ref 136–145)
WBC # BLD AUTO: 13.4 K/MM3 (ref 4–10)

## 2020-07-31 PROCEDURE — 0HQ9XZZ REPAIR PERINEUM SKIN, EXTERNAL APPROACH: ICD-10-PCS | Performed by: OBSTETRICS & GYNECOLOGY

## 2020-07-31 PROCEDURE — U0003 INFECTIOUS AGENT DETECTION BY NUCLEIC ACID (DNA OR RNA); SEVERE ACUTE RESPIRATORY SYNDROME CORONAVIRUS 2 (SARS-COV-2) (CORONAVIRUS DISEASE [COVID-19]), AMPLIFIED PROBE TECHNIQUE, MAKING USE OF HIGH THROUGHPUT TECHNOLOGIES AS DESCRIBED BY CMS-2020-01-R: HCPCS

## 2020-07-31 RX ADMIN — LORATADINE SCH MG: 10 TABLET ORAL at 18:46

## 2020-07-31 RX ADMIN — ACETAMINOPHEN PRN MG: 325 TABLET ORAL at 14:55

## 2020-07-31 RX ADMIN — LABETALOL HYDROCHLORIDE SCH MG: 200 TABLET, FILM COATED ORAL at 21:59

## 2020-07-31 RX ADMIN — Medication SCH MLS/HR: at 14:02

## 2020-07-31 RX ADMIN — IBUPROFEN PRN MG: 600 TABLET, FILM COATED ORAL at 13:00

## 2020-07-31 RX ADMIN — Medication SCH MLS/HR: at 15:08

## 2020-07-31 RX ADMIN — ACETAMINOPHEN PRN MG: 325 TABLET ORAL at 20:07

## 2020-07-31 RX ADMIN — IBUPROFEN PRN MG: 600 TABLET, FILM COATED ORAL at 20:07

## 2020-07-31 RX ADMIN — SODIUM CHLORIDE, SODIUM LACTATE, POTASSIUM CHLORIDE, CALCIUM CHLORIDE AND DEXTROSE MONOHYDRATE SCH MLS/HR: 5; 600; 310; 30; 20 INJECTION, SOLUTION INTRAVENOUS at 12:00

## 2020-07-31 RX ADMIN — LABETALOL HYDROCHLORIDE SCH MG: 200 TABLET, FILM COATED ORAL at 13:00

## 2020-07-31 NOTE — PN
Delivery





- Delivery


Type of Anesthesia: Local


Episiotomy/Laceration: 1st degree


EBL (cc): 200





Delivery, Single Birth





- Stages of Labor


Placenta: Yes: Spontaneous





- Condition of Infant


Pediatrician/Neonatologist Present: Yes


Infant Gender: Female





Remarks





- Remarks


Remarks: 


called by nurse to attend the delivery of this patient who presented to L&D as a

multip in second stage


MD on call was notified and en route.


pt. states prenatal care uncomplicated but states was told has an abnormal 

finding on fetal echo to be evaluated after delivery.


peds called to attend delivery





 live baby girl


APGARS 9,9


via perineum w first degree lac - repaired with 4-0 biosyn after 3 ml 2% 

lidocaine local anesthetic.


placenta, membranes delivered spontaneously complete and intact.  3V cord.


cord  gases sent


placenta to path


of note:  noted to have features , possible DS


peds and OB MD aware





MD arrived shortly after delivery

## 2020-07-31 NOTE — HP
Past Medical History





- Admission


Chief Complaint: Labor


History of Present Illness: 





34yo  @ 36.3wks by LUCIAN suggs 20 here in labor. 


No LOF. No VB. +regular ctx. +FM





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


PNC @ [address]


History Source: Patient


Limitations to Obtaining History: No Limitations





- Past Medical History


CNS: No: Alzheimer's, CVA, Dementia, Migraine, Multiple Sclerosis, Peripheral 

Neuropathy, Parkinson's, Seizure, Syncope, TIA, Vertigo, Other


Cardiovascular: Yes: HTN.  No: AFIB, Aneurysm, Aortic Insufficiency, Aortic 

Stenosis, CAD, CHF, Deep Vein Thrombosis, Hyperlipdemia, MI, Mitral 

Insufficiency, Mitral Stenosis, Murmur, Pulmonary Hypertension, Other


Pulmonary: Yes: Asthma.  No: Bronchitis, Cancer, COPD, O2 Dependent, Pneumonia, 

Previously Intubated, Pulmonary Embolus, Pulmonary Fibrosis, Sleep Apnea, Other


Gastrointestinal: No: Ascites, Cancer, Constipation, Crohn's Disease, 

Diverticulitis, Diverticulosis, Esophageal Varices, Gastritis, GERD, GI Bleed, 

Hemorrhoids, Hiatal Hernia, Inflamatory Bowel Disease, Irritable Bowel Disease, 

Pancreatitis, Peptic Ulcer Disease, Ulcerative Colitis, Other


Hepatobiliary: No: Cirrhosis, Cholelithiasis, Cholecystitis, 

Choledocholithiasis, Hepatitis A, Hepatitis B, Hepatitis C, Other


Renal/: No: Renal Failure, Renal Inusuff, BPH, Cancer, Hematuria, 

Hemodialysis, Neurogenic Bladder, Renal Calculi, UTI, Other


Reproductive: No: Ectopic Pregnancy, Endometriosis, Fibroids, PID, Polycystic 

Ovary Syndrome, Postmenopausal, Other


...: 6


...Para: 5


...Term: 5


Heme/Onc: Yes: Anemia


Infectious Disease: No: AIDS, C-Diff, Herpes Zoster, HIV, MRSA, STD's, 

Tuberculosis, VREF, Other


Psych: No: Addictions, Anxiety, Bipolar, Depression, Panic, Psychosis, 

Schizophrenia, Other





- Past Surgical History


Past Surgical History: No: None, AAA Repair, AICD, Amputation, Appendectomy, 

Arthrosocopy, AV Fistula/Graft, Bariatric Surgery, Breast Biopsy, Bypass, CABG, 

Carotid Endarterectomy, Cataract Removal, Cholecystectomy, Colectomy, 

Colonoscopy, Colostomy, Craniotomy, , Cystectomy, Hernia Repair, 

Hysterectomy, Ileal Conduit, Ileosotomy, Joint Replacement, Kidney Transplant, 

Laminectomy, Liver Transplant, Mastectomy, Nephrectomy, Oopherectomy, 

Orchiectomy, Permanent Pacemaker, Prostatectomy, Splenectomy, Stent, 

Thoracotomy, TURP, Tonsillectomy, Tubal Ligation, Upper Endoscopy, Valve 

Replacement, Vasectomy, Vein Stripping/Ligation


Hx Myomectomy: No


Hx Transabdominal Cerclage: No





- Smoking History


Smoking history: Never smoked


Have you smoked in the past 12 months: No


Aproximately how many cigarettes per day: 0





- Alcohol/Substance Use


Hx Alcohol Use: No


History of Substance Use: reports: None





- Social History


Usual Living Arrangement: Yes: With Spouse


Do you think of yourself as: Declined to answer


History of Recent Travel: No





Home Medications





- Allergies


Allergies/Adverse Reactions: 


                                    Allergies











Allergy/AdvReac Type Severity Reaction Status Date / Time


 


erythromycin base Allergy Mild Difficulty Verified 20 19:48





[Erythromycin Base]   Breathing  














- Home Medications


Home Medications: 


Ambulatory Orders





Prenatal Vit/Iron Fum/Folic AC [Prenatal Tablet] 1 tab PO DAILY 17 


Amox-Tr/K Cl [Augmentin 400 mg/5 ml Oral Suspension -] 10 ml PO BID #140 ml 

20 


Loratadine [Claritin -] 10 mg PO DAILY #7 tablet 20 











Physical Exam - Maternity





- Abdominal Exam/OB


Number of Fetuses: Single


Fetal Presentation: Vertex


Contractions: Yes


Regularity: Regular


Intensity: Strong


Fetal Monitor Mode: External


Fetal Heart Rate Location: [facility]


Category: I


Accelerations: Non-Uniform


Decelerations: None





- Vaginal Exam/OB


Vaginal Bleeding: No


Speculum Exam: No


Dilatation (cm): 10


Effacement (%): 100


Fetal Station: -3





- Physical Exam


Edema: No





Problem List





- Problems


(1) Precipitous delivery


Code(s): O62.3 - PRECIPITATE LABOR   





Assessment/Plan





34yo  @ 36.3wks, admitted in labor-10cm. Expect precipitous delivery


Admit to L&D


IV, admission labs


GBS negative


Baby to center given 36wks, Trisomy 21 with cardiac anomalies


Anticipate PELON Cohen MD

## 2020-07-31 NOTE — PD.OB.PROG
Past Medical History





- Primary Care Physician


Documenting Provider Type: Laborist





- Admission


Chief Complaint: post parutm hemarrage


History Source: Patient


Limitations to Obtaining History: No Limitations





- Nursing Documentation


Maternal Fetal Triage Index: 





                                        











Maternal Fetal Triage Index (  Priority 3, Prompt





MFTI)                          














Hemorrhage Risk Assessment: 





                                        











Risk Level                     Medium Risk


 


Medium Level Risk Factors for  Four (4) or greater





Hemorrhage                     


 


Low Level Risk Factors for     No previous uterine incis





Hemorrhage                     














Nursing Documentation Reviewed: Yes





- Past Medical History


CNS: Denies/None


Cardio/Vascular: Denies/None


Pulmonary: Denies/None


Gastrointestinal: Denies/None


Hepatobiliary: Denies/None


Renal/: Denies/None


...: 5


...Para: 4


...Term: 4


...: 0


...Spon : 0


...Induced : 0


...Living Children: 4


...Multiple Gestation: 0


...LMP: 19


... Weeks Gestation by Dates: 37


...EDC by Dates: 20


...EDC by Sono: 20


Heme/Onc: Denies/None


Infectious Disease: Denies/None


Psych: Denies/None


Musculoskeletal: Denies/None


Rheumatology: Denies/None


ENT: Denies/None


Endocrine: Denies/None


Dermatology: Denies/None





- Past Surgical History


Past Surgical History: No: None, AAA Repair, AICD, Amputation, Appendectomy, 

Arthrosocopy, AV Fistula/Graft, Bariatric Surgery, Breast Biopsy, Bypass, CABG, 

Carotid Endarterectomy, Cataract Removal, Cholecystectomy, Colectomy, 

Colonoscopy, Colostomy, Craniotomy, , Cystectomy, Hernia Repair, 

Hysterectomy, Ileal Conduit, Ileosotomy, Joint Replacement, Kidney Transplant, 

Laminectomy, Liver Transplant, Mastectomy, Nephrectomy, Oopherectomy, 

Orchiectomy, Permanent Pacemaker, Prostatectomy, Splenectomy, Stent, 

Thoracotomy, TURP, Tonsillectomy, Tubal Ligation, Upper Endoscopy, Valve 

Replacement, Vasectomy, Vein Stripping/Ligation





- Advance Directives


Advance Directives: Yes: Living Will





- Smoking History


Smoking history: Never smoked


Have you smoked in the past 12 months: No


Aproximately how many cigarettes per day: 0





- Alcohol/Substance Use


Hx Alcohol Use: No


History of Substance Use: reports: None





- Social History


Usual Living Arrangement: With Significant Other


Do you think of yourself as: Straight/Heterosexual


ADL: Independent


History of Recent Travel: No





Review of Systems





- Review of Systems


Constitutional: reports: No Symptoms


Eyes: reports: No Symptoms


HENT: reports: No Symptoms


Neck: reports: No Symptoms


Cardiovascular: reports: No Symptoms


Respiratory: reports: No Symptoms


Gastrointestinal: reports: No Symptoms


Genitourinary: reports: No Symptoms


Breasts: reports: No Symptoms Reported


Musculoskeletal: reports: No Symptoms


Integumentary: reports: No Symptoms


Neurological: reports: No Symptoms


Endocrine: reports: No Symptoms


Hematology/Lymphatic: reports: No Symptoms


Psychiatric: reports: No Symptoms





Physical Exam - Obstetrical


Vital Signs: 





                                   Vital Signs











Temperature  98.1 F   20 13:17


 


Pulse Rate  78   20 13:17


 


Respiratory Rate  20 13:17


 


Blood Pressure  157/96   20 13:17


 


O2 Sat by Pulse Oximetry (%)      











Constitutional: Yes: Well Nourished, No Distress, Calm


Eyes: Yes: WNL, Conjunctiva Clear, EOM Intact


HENT: Yes: WNL, Atraumatic, Normocephalic


Neck: Yes: WNL, Supple, Trachea Midline


Cardiovascular: Yes: WNL, Regular Rate and Rhythm


Lungs: Clear to auscultation


Breast(s): Yes: WNL





- Vaginal Exam/OB


Vaginal Exam Deferred: Yes


Vaginal Bleeding: Heavy


Speculum Exam: No





- Physical Exam


Musculoskeletal: Yes: WNL


Extremities: Yes: WNL


Integumentary: Yes: WNL


...Motor Strength: WNL


Psychiatric: Yes: WNL, Alert, Oriented





- Labs


Lab Results: 





                                    CBC, BMP





                                 20 13:38 





                                 20 13:38 











Assessment/Plan





post partum hemerrage, evacuated 500ml, blood clotes, cytotec 1000 mg per rectal

given

## 2020-08-01 LAB
BASOPHILS # BLD: 0.7 % (ref 0–2)
DEPRECATED RDW RBC AUTO: 14.8 % (ref 11.6–15.6)
EOSINOPHIL # BLD: 7 % (ref 0–4.5)
HCT VFR BLD CALC: 32.8 % (ref 32.4–45.2)
HGB BLD-MCNC: 10.8 GM/DL (ref 10.7–15.3)
LYMPHOCYTES # BLD: 17.7 % (ref 8–40)
MCH RBC QN AUTO: 29 PG (ref 25.7–33.7)
MCHC RBC AUTO-ENTMCNC: 32.8 G/DL (ref 32–36)
MCV RBC: 88.2 FL (ref 80–96)
MONOCYTES # BLD AUTO: 8 % (ref 3.8–10.2)
NEUTROPHILS # BLD: 66.6 % (ref 42.8–82.8)
PLATELET # BLD AUTO: 241 K/MM3 (ref 134–434)
PMV BLD: 8.9 FL (ref 7.5–11.1)
RBC # BLD AUTO: 3.72 M/MM3 (ref 3.6–5.2)
WBC # BLD AUTO: 13.7 K/MM3 (ref 4–10)

## 2020-08-01 RX ADMIN — LABETALOL HYDROCHLORIDE SCH MG: 200 TABLET, FILM COATED ORAL at 21:13

## 2020-08-01 RX ADMIN — ACETAMINOPHEN PRN MG: 325 TABLET ORAL at 21:14

## 2020-08-01 RX ADMIN — IBUPROFEN PRN MG: 600 TABLET, FILM COATED ORAL at 02:26

## 2020-08-01 RX ADMIN — Medication SCH: at 19:37

## 2020-08-01 RX ADMIN — IBUPROFEN PRN MG: 600 TABLET, FILM COATED ORAL at 09:18

## 2020-08-01 RX ADMIN — LORATADINE SCH MG: 10 TABLET ORAL at 09:24

## 2020-08-01 RX ADMIN — SODIUM CHLORIDE, SODIUM LACTATE, POTASSIUM CHLORIDE, CALCIUM CHLORIDE AND DEXTROSE MONOHYDRATE SCH: 5; 600; 310; 30; 20 INJECTION, SOLUTION INTRAVENOUS at 19:36

## 2020-08-01 RX ADMIN — Medication SCH TAB: at 09:19

## 2020-08-01 RX ADMIN — LABETALOL HYDROCHLORIDE SCH MG: 200 TABLET, FILM COATED ORAL at 09:18

## 2020-08-01 NOTE — PN
Post Partum Progress Note


Post Partum Day: 1


Type of Delivery: 


Vital Signs: 


                                   Vital Signs











Temperature  97.8 F   20 02:00


 


Pulse Rate  75   20 02:00


 


Respiratory Rate  18   20 02:00


 


Blood Pressure  118/77   20 02:00


 


O2 Sat by Pulse Oximetry (%)  94 L  20 22:00











Uterus: Yes: Fundus below umbilicus


Abdomen/GI: Yes: Abdomen soft, Tolerating PO


Lochia: Yes: Rubra


Lochia, amount: Small


Extremities: Yes: Calves non-tender


Perineum: Yes: Intact


Activity: Ambulating





- Labs


Labs: 


                                       CBC











WBC  13.4 K/mm3 (4.0-10.0)  H  20  13:38    


 


RBC  4.21 M/mm3 (3.60-5.2)   20  13:38    


 


Hgb  12.3 GM/dL (10.7-15.3)   20  13:38    


 


Hct  37.5 % (32.4-45.2)  D 20  13:38    


 


MCV  89.0 fl (80-96)   20  13:38    


 


MCH  29.2 pg (25.7-33.7)   20  13:38    


 


MCHC  32.8 g/dl (32.0-36.0)   20  13:38    


 


RDW  14.6 % (11.6-15.6)  D 20  13:38    


 


Plt Count  269 K/MM3 (134-434)   20  13:38    


 


MPV  9.0 fl (7.5-11.1)   20  13:38    


 


Absolute Neuts (auto)  10.3 K/mm3 (1.5-8.0)  H  20  13:38    


 


Neutrophils %  77.0 % (42.8-82.8)  D 20  13:38    


 


Lymphocytes %  12.7 % (8-40)  D 20  13:38    


 


Monocytes %  5.7 % (3.8-10.2)   20  13:38    


 


Eosinophils %  3.5 % (0-4.5)   20  13:38    


 


Basophils %  1.1 % (0-2.0)   20  13:38    


 


Nucleated RBC %  0 % (0-0)   20  13:38    














Problem List





- Problems


(1) Precipitous delivery


Code(s): O62.3 - PRECIPITATE LABOR   





Assessment/Plan





36yo  s/p ,PPD#1


Routine PP care


PO pain control


F/U AM labs


Lab 200mg BID for CHTN


Possible d/c to home today; as baby is at [facility] 2/2 cardiac issues





WADE Cohen MD

## 2020-08-01 NOTE — DS
Physical Examination


Vital Signs: 


                                   Vital Signs











Temperature  98 F   20 06:00


 


Pulse Rate  69   20 06:00


 


Respiratory Rate  18   20 06:00


 


Blood Pressure  148/89   20 06:00


 


O2 Sat by Pulse Oximetry (%)  94 L  20 22:00











Constitutional: Yes: Well Nourished, No Distress, Calm


Eyes: Yes: WNL, Conjunctiva Clear, EOM Intact


HENT: Yes: WNL, Atraumatic, Normocephalic


Neck: Yes: WNL, Supple, Trachea Midline


Cardiovascular: Yes: WNL, Regular Rate and Rhythm


Respiratory: Yes: WNL, Regular, CTA Bilaterally


Gastrointestinal: Yes: WNL, Normal Bowel Sounds


Musculoskeletal: Yes: WNL


Extremities: Yes: WNL


Edema: No


Integumentary: Yes: WNL


Neurological: Yes: WNL, Alert, Oriented


...Motor Strength: WNL


Psychiatric: Yes: WNL


Labs: 


                                    CBC, BMP





                                 20 13:38 











Discharge Summary


Problems reviewed: Yes


Reason For Visit: LABOR ADMISSION


Current Active Problems





Precipitous delivery (Acute)








Procedures: Principal: 


Hospital Course: 


Patient presented in precipitous  labor at 36 weeks.


Had an uncomplicated ; infant with known Tr 21. Sent to [facility] for cardiac 

evaluation


Patient had a PPH, given Cytotec with improvement


She met all postpartum milestones


She was discharged home in stable condition


Condition: Stable





- Instructions


Diet, Activity, Other Instructions: 


Regular Diet


Follow up in 4 weeks for your postpartum visit


Referrals: 


Jet Singh MD [Staff Physician] - 


Disposition: HOME





- Home Medications


Comprehensive Discharge Medication List: 


Ambulatory Orders





Prenatal Vit/Iron Fum/Folic AC [Prenatal Tablet] 1 tab PO DAILY 17 


Amox-Tr/K Cl [Augmentin 400 mg/5 ml Oral Suspension -] 10 ml PO BID #140 ml 

20 


Loratadine [Claritin -] 10 mg PO DAILY #7 tablet 20 


Aspirin 81 mg PO DAILY 20 


Labetalol HCl [Normodyne -] 200 mg PO BID 20 


Ferrous Sulfate [Feosol] 325 mg PO DAILY #30 tablet 20 


Ibuprofen 600 mg PO Q6H PRN #30 tablet 20 


Labetalol HCl 200 mg PO BID #60 tablet 20

## 2020-08-02 VITALS — DIASTOLIC BLOOD PRESSURE: 82 MMHG | TEMPERATURE: 97.6 F | HEART RATE: 87 BPM | SYSTOLIC BLOOD PRESSURE: 152 MMHG

## 2020-08-02 RX ADMIN — Medication SCH TAB: at 09:01

## 2020-08-02 RX ADMIN — ACETAMINOPHEN PRN MG: 325 TABLET ORAL at 08:16

## 2020-08-02 RX ADMIN — ACETAMINOPHEN PRN MG: 325 TABLET ORAL at 01:31

## 2020-08-02 RX ADMIN — LABETALOL HYDROCHLORIDE SCH MG: 200 TABLET, FILM COATED ORAL at 09:01

## 2020-08-02 RX ADMIN — IBUPROFEN PRN MG: 600 TABLET, FILM COATED ORAL at 11:23

## 2020-08-02 RX ADMIN — IBUPROFEN PRN MG: 600 TABLET, FILM COATED ORAL at 01:31

## 2020-08-02 RX ADMIN — LORATADINE SCH MG: 10 TABLET ORAL at 09:00

## 2020-08-07 NOTE — PATH
Surgical Pathology Report



Patient Name:  TENA GARZA

Accession #:  [id number]

Med. Rec. #:  [id number]                                                        

   /Age/Gender:  1985 (Age: 35) / F

Account:  [id number]                                                          

             Location: [facility] OBS/GYN

Taken:  2020

Received:  8/3/2020

Reported:  2020

Physicians:  MADYSON Whitehead

  



Specimen(s) Received

 PLACENTA 





Clinical History

 at 37 weeks







Final Diagnosis

PLACENTA, DELIVERY:

436 G THIRD TRIMESTER PLACENTA WITH TRIVASCULAR UMBILICAL CORD AND UNREMARKABLE

PLACENTAL MEMBRANES.





***Electronically Signed***

Carmen Moran M.D.





Gross Description

The specimen is received fresh labeled placenta and is a 436 gram, 23.5 x 14.0 x

2.0 cm. placenta with attached membranes and umbilical cord.  The attached

membranes are tan-gray, translucent with focal opacities and insert marginally. 

The umbilical cord measures 7.5 cm. in length and averages 1.1 cm. in diameter. 

The cord inserts eccentrically, 6 cm. to the nearest margin.  No true knots or

strictures are identified. Cut surface of the umbilical cord reveals 3 vessels.

The fetal surface is gray blue with minimal fibrin deposition and appropriate

caliber vessels. The maternal surface is red-brown with focal defects. 

Sectioning reveals red-brown, spongy parenchyma. No lesions are identified. 

Representative sections are submitted in three cassettes as follows: 1- membrane

rolls and umbilical cord; 2-3- full thickness sections of placenta.





[facility]